# Patient Record
Sex: MALE | Employment: OTHER | URBAN - METROPOLITAN AREA
[De-identification: names, ages, dates, MRNs, and addresses within clinical notes are randomized per-mention and may not be internally consistent; named-entity substitution may affect disease eponyms.]

---

## 2024-04-03 ENCOUNTER — OFFICE VISIT (OUTPATIENT)
Dept: NEUROSURGERY | Facility: CLINIC | Age: 84
End: 2024-04-03
Payer: MEDICARE

## 2024-04-03 VITALS — TEMPERATURE: 97.5 F

## 2024-04-03 DIAGNOSIS — M54.50 ACUTE RIGHT-SIDED LOW BACK PAIN WITHOUT SCIATICA: Primary | ICD-10-CM

## 2024-04-03 PROBLEM — R77.9 ELEVATED SERUM PROTEIN LEVEL: Status: ACTIVE | Noted: 2018-08-02

## 2024-04-03 PROBLEM — I25.10 CORONARY ARTERIOSCLEROSIS: Status: ACTIVE | Noted: 2023-08-03

## 2024-04-03 PROBLEM — I49.3 PVC'S (PREMATURE VENTRICULAR CONTRACTIONS): Status: ACTIVE | Noted: 2018-07-12

## 2024-04-03 PROBLEM — N18.31 STAGE 3A CHRONIC KIDNEY DISEASE (MULTI): Status: ACTIVE | Noted: 2022-03-01

## 2024-04-03 PROBLEM — E66.9 OBESITY: Status: ACTIVE | Noted: 2023-08-03

## 2024-04-03 PROBLEM — I47.29 NONSUSTAINED VENTRICULAR TACHYCARDIA (MULTI): Status: ACTIVE | Noted: 2017-09-07

## 2024-04-03 PROCEDURE — 1160F RVW MEDS BY RX/DR IN RCRD: CPT | Performed by: NURSE PRACTITIONER

## 2024-04-03 PROCEDURE — 1159F MED LIST DOCD IN RCRD: CPT | Performed by: NURSE PRACTITIONER

## 2024-04-03 PROCEDURE — 99204 OFFICE O/P NEW MOD 45 MIN: CPT | Performed by: NURSE PRACTITIONER

## 2024-04-03 PROCEDURE — 1036F TOBACCO NON-USER: CPT | Performed by: NURSE PRACTITIONER

## 2024-04-03 RX ORDER — ASPIRIN 81 MG/1
TABLET ORAL
COMMUNITY

## 2024-04-03 RX ORDER — TAMSULOSIN HYDROCHLORIDE 0.4 MG/1
CAPSULE ORAL
COMMUNITY

## 2024-04-03 RX ORDER — PRAVASTATIN SODIUM 40 MG/1
TABLET ORAL
COMMUNITY
Start: 2024-02-12

## 2024-04-03 RX ORDER — METOPROLOL SUCCINATE 25 MG/1
25 TABLET, EXTENDED RELEASE ORAL 2 TIMES DAILY
COMMUNITY
Start: 2023-04-29

## 2024-04-03 ASSESSMENT — PATIENT HEALTH QUESTIONNAIRE - PHQ9
1. LITTLE INTEREST OR PLEASURE IN DOING THINGS: NOT AT ALL
SUM OF ALL RESPONSES TO PHQ9 QUESTIONS 1 & 2: 0
2. FEELING DOWN, DEPRESSED OR HOPELESS: NOT AT ALL

## 2024-04-03 NOTE — PROGRESS NOTES
"It was a pleasure to see Lee Allen on 4/3/2024. He is an 83 y.o. year old  male who presents, with his wife, to the [facility] for evaluation of right low back pain. Patient is referred by No ref. provider found. PMH is significant for HTN / HPL, cardiomyopathy, RBBB, DM2, CKD 3a, JO (CPAP), AAA, MGUS (follows with heme/onc at [facility])    Lee Allen has had symptoms of right low back pain (points to right lateral buttocks) over the past month, without inciting event. He had lumbar x-rays at [facility] 1 year ago that noted \"moderate to advanced diffuse lumbar spondylosis\". Progression of symptoms prompted [facility] [place] [facility] ED visit on 03/13/2024, where CT L Spine demonstrated no acute findings. Thus far, patient has tried activity adjustment with little to no improvement of symptoms. He denies change in bowel / bladder function, saddle anesthesia, imbalance, falls, difficulty dressing, difficulty holding / opening objects.     CT LUMBAR SPINE on 03/13/2024 at [facility]:  IMPRESSION:   1. No evidence of acute fracture or dislocation.   2. Straightening of the lumbar spine which may be due to degenerative changes, positioning, muscular spasm or ligamentous injury.   3. Left nonobstructing nephrolithiasis and/or vascular calcifications. No visualized hydronephrosis.   4. Partially visualized abdominal aortic aneurysm measuring up to approximately 3.9 x 3.5 cm. Follow-up imaging in 2 years is recommended.   5. Other incidental/nonemergent findings as above.   Electronically signed by: Heather Madison MD     PREVIOUS TREATMENTS  NSAIDs - contraindicated (CKD)  Topical    Previous Spine Surgery:  Lumbar disc surgery at [facility] 07/ 2006      Smoker: Former (quit one year ago)  Anticoagulation / Antiplatelets: YES: Daily Baby ASA     ROS: 12 / 12 systems reviewed and are negative unless noted in HPI    Temp 36.4 °C (97.5 °F)   Height 5' 6\"  Weight 250 lbs    ON EXAM:  General: Morbidly obese male, " awake/alert/oriented x 3, no distress, alert and cooperative  Skin: Warm and dry, no visible lesions / rashes  ENMT: Mucous membranes moist, no apparent injury  Head/Neck: No apparent injury  Respiratory/Thorax: Normal breathing with good chest expansion, thorax symmetric  Cardiovascular: No pitting edema, no JVD  Gastrointestinal: Non-distended  NEUROLOGICAL EXAM:  EOMI, face symmetric  Motor Strength: LEFT FOOT DROP (not new per patient); otherwise, 5/5 in BLE  Muscle Tone: Normal without spasticity or contractures in all extremities  Muscle Bulk: Normal and symmetric in all extremities  Posture:  -- Cervical: Normal  -- Thoracic: Normal  -- Lumbar : Normal  Paraspinal muscle spasm/tenderness absent.  No palpable tenderness along the spinous processes.  Sensation: SILT in BLE  Gait: Normal with use of cane  Deep Tendon Reflexes: 0 BLE       Lee Allen has right sided low back pain. We reviewed CT L Spine [place] [facility] with pointing out of retrolisthesis at L5 - S1, multilevel degenerative changes. We discussed rationale for dynamic lumbar imaging and for Physical Therapy for Home Exercise Program. Encouraged follow up in 6 - 8 weeks for reassessment. If not improved, will consider MRI L Spine. He verbalizes understanding and agreement with plan.  Bonita Davis, APRN-CNP

## 2024-05-02 ENCOUNTER — OFFICE VISIT (OUTPATIENT)
Dept: NEUROSURGERY | Facility: CLINIC | Age: 84
End: 2024-05-02
Payer: MEDICARE

## 2024-05-02 VITALS
BODY MASS INDEX: 39.24 KG/M2 | WEIGHT: 250 LBS | HEART RATE: 66 BPM | DIASTOLIC BLOOD PRESSURE: 68 MMHG | HEIGHT: 67 IN | SYSTOLIC BLOOD PRESSURE: 114 MMHG | TEMPERATURE: 97.7 F

## 2024-05-02 DIAGNOSIS — M21.372 LEFT FOOT DROP: Primary | ICD-10-CM

## 2024-05-02 PROCEDURE — 99213 OFFICE O/P EST LOW 20 MIN: CPT | Performed by: NURSE PRACTITIONER

## 2024-05-02 PROCEDURE — 1126F AMNT PAIN NOTED NONE PRSNT: CPT | Performed by: NURSE PRACTITIONER

## 2024-05-02 PROCEDURE — 3078F DIAST BP <80 MM HG: CPT | Performed by: NURSE PRACTITIONER

## 2024-05-02 PROCEDURE — 1159F MED LIST DOCD IN RCRD: CPT | Performed by: NURSE PRACTITIONER

## 2024-05-02 PROCEDURE — 3074F SYST BP LT 130 MM HG: CPT | Performed by: NURSE PRACTITIONER

## 2024-05-02 PROCEDURE — 1160F RVW MEDS BY RX/DR IN RCRD: CPT | Performed by: NURSE PRACTITIONER

## 2024-05-02 PROCEDURE — 1036F TOBACCO NON-USER: CPT | Performed by: NURSE PRACTITIONER

## 2024-05-02 ASSESSMENT — ENCOUNTER SYMPTOMS: OCCASIONAL FEELINGS OF UNSTEADINESS: 0

## 2024-05-02 ASSESSMENT — PATIENT HEALTH QUESTIONNAIRE - PHQ9
1. LITTLE INTEREST OR PLEASURE IN DOING THINGS: NOT AT ALL
2. FEELING DOWN, DEPRESSED OR HOPELESS: NOT AT ALL
SUM OF ALL RESPONSES TO PHQ9 QUESTIONS 1 AND 2: 0

## 2024-05-02 ASSESSMENT — PAIN SCALES - GENERAL: PAINLEVEL: 0-NO PAIN

## 2024-05-02 NOTE — PROGRESS NOTES
"Lee Allen is here today, with his wife, in follow up for right low back pain and to review images.     PMH: HTN / HPL, cardiomyopathy, RBBB, DM2, CKD 3a, JO (CPAP), AAA, MGUS (follows with heme/onc at [facility])     To review, he was initially evaluated on 04/03/2024. He reported right low back pain (points to right lateral buttocks) over the past month, without inciting event. He had lumbar x-rays at [facility] 1 year ago that noted \"moderate to advanced diffuse lumbar spondylosis\". Progression of symptoms [place] [facility] [place] [facility] [place], where CT L Spine demonstrated no acute findings. On exam, he had chronic left foot drop. Orders were written for x-ray lumbar spine and referral to PT.    Today, he feels he is about the same. [place] [facility] [place] and [place] to review findings.    [place]2024[place] [facility]  Stable advanced degenerative changes L2 - S1.  Signed by Sang Yanez MD    TREATMENTS:  PT: 04/16/2023 - 04/30/2023  Home Exercise Program: daily lumbar stretches, core strengthening since 04/16/2023  NSAIDs - contraindicated (CKD)  Topical  Lumbar disc surgery at [facility] 07/ 2006     SMOKER: Former (quit in 2023)  ANTICOAGULANT USE: YES: Daily baby ASA    ROS x 10 is, otherwise, negative unless documented in HPI above    /68   Pulse 66   Temp 36.5 °C (97.7 °F) (Temporal)   Ht 1.702 m (5' 7\")   Wt 113 kg (250 lb)   BMI 39.16 kg/m²     On Exam: Appears comfortable  A&O x 4, speech clear / fluent  Respirations even / unlabored  Abdomen without distension  DIAL  Gait is antalgic towards the left    [place] [facility] [place] note of mild thoracolumbar scoliosis, multilevel foraminal narrowing, disc degeneration, anterior osteophytes, facet arthropathy, and no dynamic instability to my review of images. We discussed his response to PT / HEP: he has minimally improved, but continues with limitation of activity due to shortness of breath. He follows with his " pulmomologist for this. States the therapist told him that his right back pain is caused by not wearing his AFO boot. He asks for Rx for left AFO boot since old boot has worn down and does not fit his shoes. Rx for left AFO boot for left foot drop given to patient. He states he will schedule another appointment if new AFO does not help with his right back pain. Agree with plan. He does not wish to have MRI if not needed.    Bonita Davis, APRN-CNP

## 2024-09-13 ENCOUNTER — OFFICE VISIT (OUTPATIENT)
Dept: NEUROSURGERY | Facility: CLINIC | Age: 84
End: 2024-09-13
Payer: MEDICARE

## 2024-09-13 VITALS
SYSTOLIC BLOOD PRESSURE: 122 MMHG | HEART RATE: 51 BPM | HEIGHT: 67 IN | TEMPERATURE: 98.4 F | BODY MASS INDEX: 40.81 KG/M2 | DIASTOLIC BLOOD PRESSURE: 80 MMHG | WEIGHT: 260 LBS

## 2024-09-13 DIAGNOSIS — M54.50 ACUTE RIGHT-SIDED LOW BACK PAIN WITHOUT SCIATICA: Primary | ICD-10-CM

## 2024-09-13 PROCEDURE — 3074F SYST BP LT 130 MM HG: CPT | Performed by: NURSE PRACTITIONER

## 2024-09-13 PROCEDURE — 1159F MED LIST DOCD IN RCRD: CPT | Performed by: NURSE PRACTITIONER

## 2024-09-13 PROCEDURE — 99213 OFFICE O/P EST LOW 20 MIN: CPT | Performed by: NURSE PRACTITIONER

## 2024-09-13 PROCEDURE — 3079F DIAST BP 80-89 MM HG: CPT | Performed by: NURSE PRACTITIONER

## 2024-09-13 PROCEDURE — 1036F TOBACCO NON-USER: CPT | Performed by: NURSE PRACTITIONER

## 2024-09-13 PROCEDURE — 1125F AMNT PAIN NOTED PAIN PRSNT: CPT | Performed by: NURSE PRACTITIONER

## 2024-09-13 RX ORDER — METOPROLOL SUCCINATE 50 MG/1
50 TABLET, EXTENDED RELEASE ORAL 2 TIMES DAILY
COMMUNITY

## 2024-09-13 RX ORDER — LORATADINE 10 MG/1
10 TABLET ORAL DAILY
COMMUNITY
Start: 2023-06-24

## 2024-09-13 RX ORDER — FLUTICASONE FUROATE, UMECLIDINIUM BROMIDE AND VILANTEROL TRIFENATATE 100; 62.5; 25 UG/1; UG/1; UG/1
1 POWDER RESPIRATORY (INHALATION)
COMMUNITY
Start: 2024-07-26

## 2024-09-13 RX ORDER — LISINOPRIL 10 MG/1
10 TABLET ORAL DAILY
COMMUNITY

## 2024-09-13 ASSESSMENT — PAIN SCALES - GENERAL: PAINLEVEL: 2

## 2024-09-13 NOTE — PROGRESS NOTES
"Lee Allen is here today in follow up with right sided low back pain.     PMH is significant for HTN / HPL, cardiomyopathy, RBBB, DM2, CKD 3a, JO (CPAP), AAA, MGUS (follows with heme/onc at [facility])     To review, he was initially evaluated on 04/03/2024. He reported right low back pain (points to right lateral buttocks) over the past month, without inciting event. He had lumbar x-rays at [facility] 1 year ago that noted \"moderate to advanced diffuse lumbar spondylosis\". Progression of symptoms [place] [facility] [place] where CT L Spine.  We reviewed [place] Spine [place] [facility] with pointing out of retrolisthesis at L5 - S1, multilevel degenerative changes. We discussed rationale for dynamic lumbar imaging and for Physical Therapy for Home Exercise Program.  On exam, he had baseline left foot drop; absent patellar reflexes. Orders were written for dynamic lumbar x-rays and he was referred to PT for HEP.     At his visit on 05/02/2024: he felt about the same. We [place] [facility] with note of mild thoracolumbar scoliosis, multilevel foraminal narrowing, disc degeneration, anterior osteophytes, facet arthropathy, and no dynamic instability to my review of images. We discussed his response to PT / HEP: he has minimally improved, but continues with limitation of activity due to shortness of breath. He follows with his pulmomologist for this. States the therapist told him that his right back pain is caused by not wearing his AFO boot. He asked for and was provided Rx for left AFO boot since old boot has worn down and does not fit his shoes. He wished to schedule another appointment if new AFO does not help with his right back pain. Agree with plan. He did not wish to have MRI if not needed.      Today, he reports an episode of inability to get out of bed due to right posterior hip pain this lasted for 2 days, but now has mild right sided back pain. This has since resolved with the " "use of ibuprofen. He has been using new AFO boot, without significant improvement in lumbar symptoms.     TREATMENTS:  PT: 04/16/2023 - 04/30/2023  Home Exercise Program: daily lumbar stretches, core strengthening since 04/16/2023  NSAIDs - contraindicated (CKD)  Topical  Lumbar disc surgery at [facility] 07/ 2006    SMOKER: Former  ANTICOAGULANT USE: YES: daily baby ASA    ROS x 10 is, otherwise, negative unless documented in HPI above    /80 (BP Location: Left arm, Patient Position: Sitting, BP Cuff Size: Large adult)   Pulse 51   Temp 36.9 °C (98.4 °F) (Temporal)   Ht 1.702 m (5' 7\")   Wt 118 kg (260 lb)   BMI 40.72 kg/m²     On Exam: Appears comfortable  A&O x 4, speech clear / fluent  Respirations even / unlabored  Abdomen without distension  DIAL  No palpable tenderness   Gait: is steady with use of cane    Lee Allen has right sided low back pain that has resolved. He verbalizes understanding and agreement with plan to follow up PRN.   Bonita Davis, APRN-CNP          "